# Patient Record
Sex: FEMALE | Race: WHITE
[De-identification: names, ages, dates, MRNs, and addresses within clinical notes are randomized per-mention and may not be internally consistent; named-entity substitution may affect disease eponyms.]

---

## 2021-12-06 PROBLEM — Z00.00 ENCOUNTER FOR PREVENTIVE HEALTH EXAMINATION: Status: ACTIVE | Noted: 2021-12-06

## 2021-12-08 ENCOUNTER — APPOINTMENT (OUTPATIENT)
Dept: ORTHOPEDIC SURGERY | Facility: CLINIC | Age: 56
End: 2021-12-08
Payer: COMMERCIAL

## 2021-12-08 ENCOUNTER — TRANSCRIPTION ENCOUNTER (OUTPATIENT)
Age: 56
End: 2021-12-08

## 2021-12-08 ENCOUNTER — NON-APPOINTMENT (OUTPATIENT)
Age: 56
End: 2021-12-08

## 2021-12-08 VITALS
HEIGHT: 63 IN | BODY MASS INDEX: 18.61 KG/M2 | WEIGHT: 105 LBS | OXYGEN SATURATION: 98 % | SYSTOLIC BLOOD PRESSURE: 128 MMHG | DIASTOLIC BLOOD PRESSURE: 68 MMHG | HEART RATE: 80 BPM

## 2021-12-08 DIAGNOSIS — M25.511 PAIN IN RIGHT SHOULDER: ICD-10-CM

## 2021-12-08 DIAGNOSIS — G89.29 PAIN IN RIGHT SHOULDER: ICD-10-CM

## 2021-12-08 DIAGNOSIS — Z86.39 PERSONAL HISTORY OF OTHER ENDOCRINE, NUTRITIONAL AND METABOLIC DISEASE: ICD-10-CM

## 2021-12-08 PROCEDURE — 99203 OFFICE O/P NEW LOW 30 MIN: CPT

## 2021-12-08 NOTE — HISTORY OF PRESENT ILLNESS
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  She recently saw an Orthopedist at [facility], and had shoulder xrays which were negative for fracture or DJD.  She was diagnosed with adhesive capsulitis, and was instructed to start PT.  She has been reluctant to take PO NSAIDS.  She has had similar left shoulder issues related to adhesive capsulitis/rotator cuff tendinopathy, and required a protracted course of physical therapy until symptom resolution.\par \par She is interested in potential hydrodilation procedure of the glenohumeral joint.\par \par Pain is rated 3/10, described as sharp, improved with rest, worse with reaching motions.\par \par

## 2021-12-08 NOTE — DISCUSSION/SUMMARY
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  All questions were answered appropriately.\par \par The patient was counseled on the natural progression of the problem(s) today and potential complications of diagnoses.  The patient was provided reassurance today.\par \par Patient interested in ultrasound-guided glenohumeral hydrodilation procedure.  She requests authorization with her insurance company.\par \par Patient has exhausted conservative treatment including relative rest, activity modification, at least 8 weeks of physician-directed home exercise program/physical therapy, pharmacologics, observation.  IT IS MEDICALLY NECESSARY TO APPROVE THE FOLLOWING PROCEDURE.\par \par Ok to initiate PT.\par \par May consider acupuncture.\par \par Counseled on all natural supplements - Curcumin/Turmeric, Vitamin D3, Fish Oil.\par \par May follow-up in 2 weeks for procedure.\par \par ------------------------------------------------------------------------------------------------------------------\par Patient appreciates and agrees with current plan.\par \par The patient's diagnosis above was evaluated by me, personally.  Diagnostic Testing and treatment options were discussed with the patient in detail.  The risks/benefits/potential complications of diagnostic testing/treatments were described in detail.  \par \par This note was generated using a mixture of manual typing and dragon medical dictation software.  A reasonable effort has been made for proofreading its contents, but typos may still remain.  If there are any questions or points of clarification needed please notify my office.\par \par \par >30 minutes of time was spent on total encounter.  >50% of the visit was spent on face-to-face counseling/coordination of care and medical-decision making for this patient.\par \par \par

## 2021-12-08 NOTE — PHYSICAL EXAM
[de-identified] : General: Well-nourished, well-developed, alert, and in no acute distress.\par Head: Normocephalic.\par Eyes: Pupils equal round reactive to light and accommodation, extraocular muscles intact, normal sclera.\par Nose: No nasal discharge.\par Cardiac: Regular rate. Extremities are warm and well perfused. Distal pulses are symmetric bilaterally.\par Respiratory: No labored breathing.\par Extremities: Sensation is intact distally bilaterally.  Distal pulses are symmetric bilaterally\par Lymphatic: No regional lymphadenopathy, no lymphedema\par Neurologic: No focal deficits\par Skin: Normal skin color, texture, and turgor\par Psychiatric: Normal affect\par MSK: as noted above/below\par \par \par \par RIGHT SHOULDER:\par  \par Inspection:no bruising, rash, erythema, deformity\par Joint Effusion:no\par ROM:DECREASED IN ALL PLANES - FF/ABDUCTION ~130, ER ~60, IR TO LOW LUMBAR\par Palpation: TRACE GH JOINT PAIN, NO AC joint pain, Bicipital Groove Pain , , Clavicle pain , GT pain \par Distal Pulses:normal\par Upper Extremity Reflexes:2+\par Shoulder Strength: 5/5 \par Upper Extremity Sensation: normal\par \par Special Tests:\par Empty Can: Negative \par Cross Arm: Negative\par Neer: Negative\par Archer: Negative\par Speed: Negative\par \par \par LEFT SHOULDER:\par  \par Inspection:no bruising, rash, erythema, deformity\par Joint Effusion:no\par ROM:normal Forward Flexion, Abduction , Internal Rotation: , External Rotation \par Palpation: NO AC joint pain, Bicipital Groove Pain , GH joint pain , Clavicle pain , GT pain \par Distal Pulses:normal\par Upper Extremity Reflexes:2+\par Shoulder Strength: 5/5 \par Upper Extremity Sensation: normal\par \par Special Tests:\par Empty Can: Negative \par Lift-Off Test: Negative \par Cross Arm: Negative\par Neer: Negative\par Archer: Negative\par Speed: Negative\par \par \par  [de-identified] : Xray RIGHT Shoulder from outside facility - Multiple views were reviewed with the patient in detail.  There is no acute fracture or dislocation.  There is no joint effusion.  Joint spaces are preserved.\par

## 2021-12-14 ENCOUNTER — APPOINTMENT (OUTPATIENT)
Dept: ORTHOPEDIC SURGERY | Facility: CLINIC | Age: 56
End: 2021-12-14
Payer: COMMERCIAL

## 2021-12-14 ENCOUNTER — NON-APPOINTMENT (OUTPATIENT)
Age: 56
End: 2021-12-14

## 2021-12-14 DIAGNOSIS — M75.01 ADHESIVE CAPSULITIS OF RIGHT SHOULDER: ICD-10-CM

## 2021-12-14 PROCEDURE — 20611 DRAIN/INJ JOINT/BURSA W/US: CPT | Mod: RT

## 2021-12-14 RX ORDER — TRAMADOL HYDROCHLORIDE 50 MG/1
50 TABLET, COATED ORAL
Qty: 20 | Refills: 0 | Status: ACTIVE | COMMUNITY
Start: 2021-12-14 | End: 1900-01-01

## 2021-12-14 RX ORDER — TRAMADOL HYDROCHLORIDE 50 MG/1
50 TABLET, COATED ORAL
Qty: 20 | Refills: 0 | Status: DISCONTINUED | COMMUNITY
Start: 2021-12-14 | End: 2021-12-14

## 2021-12-14 NOTE — PROCEDURE
[de-identified] : Ultrasound Guided Injection/Hydrodilation\par Indication: Ensure placement within the glenohumeral space/recess,  without damaging the tendons\par \par Utlizing the [email or web address] portable ultrasound machine, the Linear transducer, sterilized probe, using ultrasound guidance with the probe in the transverse axis, utilizing an in plane approach, was used for the following injection:\par \par Injection: RIGHT GLENOHUMERAL SPACE/RECESS INJECTION/HYDRODILATION\par Indication: ADHESIVE CAPSULITIS\par \par A discussion was had with the patient regarding this procedure and all questions were answered. All risks, benefits and alternatives were discussed. These included but were not limited to bleeding, infection, injection site reaction/complication and allergic reaction. A timeout was performed prior to the procedure to ensure proper side.  Utilizing ultrasound guidance, the glenohumeral space/recess was visualized.  Alcohol was used to clean and sterilize the skin over the posterior aspect of the shoulder. A 22-gauge needle was used to inject 4cc of lidocaine 1% without epinephrine into the glenohumeral space/recess.   After waiting period, the glenohumeral joint was expanded with 5cc of 0.9% normal saline.  A sterile bandage was then applied. The patient tolerated the procedure well and there were no complications.\par \par Post-injection, patient was mobilized utilizing passive ROM and osteopathic Flex technique.\par \par Exam:\par RIGHT SHOULDER:\par  PRE-INJECTION\par ROM:DECREASED IN ALL PLANES - FF/ABDUCTION ~130, ER ~60, IR TO LOW LUMBAR\par POST INJECTION\par ROM: FF/ABDUCTION ~160, ER ~70, IR TO LOW THORACIC\par \par Plan:\par -For breakthrough pain, Patient was prescribed a short course of Tramadol PRN.  Prior to prescribing, history of the patient's scheduled medication use was reviewed utilizing the I:STOP [place]  aware program.  Appropriate use of medication was discussed with the patient in detail.  The risks, benefits, adverse effects, alternative options were discussed.  The patient expressed understanding.\par -Continue PT next week\par -Follow-up in 4 weeks\par

## 2022-01-18 ENCOUNTER — APPOINTMENT (OUTPATIENT)
Dept: ORTHOPEDIC SURGERY | Facility: CLINIC | Age: 57
End: 2022-01-18

## 2022-04-11 RX ORDER — TRAMADOL HYDROCHLORIDE 50 MG/1
50 TABLET, COATED ORAL
Qty: 21 | Refills: 0 | Status: ACTIVE | COMMUNITY
Start: 2022-04-11 | End: 1900-01-01

## 2022-06-08 ENCOUNTER — NON-APPOINTMENT (OUTPATIENT)
Age: 57
End: 2022-06-08